# Patient Record
Sex: MALE | Race: WHITE | NOT HISPANIC OR LATINO | Employment: OTHER | URBAN - METROPOLITAN AREA
[De-identification: names, ages, dates, MRNs, and addresses within clinical notes are randomized per-mention and may not be internally consistent; named-entity substitution may affect disease eponyms.]

---

## 2017-05-30 ENCOUNTER — NEW REFERRAL (OUTPATIENT)
Dept: URBAN - METROPOLITAN AREA CLINIC 33 | Facility: CLINIC | Age: 82
End: 2017-05-30

## 2017-05-30 VITALS
DIASTOLIC BLOOD PRESSURE: 88 MMHG | SYSTOLIC BLOOD PRESSURE: 145 MMHG | HEIGHT: 70 IN | BODY MASS INDEX: 27.49 KG/M2 | HEART RATE: 85 BPM | WEIGHT: 192 LBS

## 2017-05-30 DIAGNOSIS — H04.123: ICD-10-CM

## 2017-05-30 DIAGNOSIS — H43.393: ICD-10-CM

## 2017-05-30 DIAGNOSIS — H35.371: ICD-10-CM

## 2017-05-30 DIAGNOSIS — H02.836: ICD-10-CM

## 2017-05-30 DIAGNOSIS — H02.833: ICD-10-CM

## 2017-05-30 PROCEDURE — 99204 OFFICE O/P NEW MOD 45 MIN: CPT

## 2017-05-30 PROCEDURE — 92134 CPTRZ OPH DX IMG PST SGM RTA: CPT

## 2017-05-30 PROCEDURE — 92225 OPHTHALMOSCOPY (INITIAL): CPT

## 2017-05-30 PROCEDURE — G8427 DOCREV CUR MEDS BY ELIG CLIN: HCPCS

## 2017-05-30 PROCEDURE — 1036F TOBACCO NON-USER: CPT

## 2017-05-30 ASSESSMENT — VISUAL ACUITY
OS_SC: 20/25+2
OD_SC: 20/200

## 2017-05-30 ASSESSMENT — TONOMETRY
OD_IOP_MMHG: 10
OS_IOP_MMHG: 11

## 2020-10-07 NOTE — PATIENT DISCUSSION
NON-LVC 2' ASYMEMTRIC ASTIG , INF STEEPENING &gt; 2.0 D &amp; UNSTABLE RX &gt; 1.5 D CHANGE. EDU ON FINDINGS AND RISK OF ECTASIA. CONTINUE ROUTINE CARE. W/ DR. Rhiannon Montana.